# Patient Record
Sex: MALE | Race: WHITE | NOT HISPANIC OR LATINO | Employment: UNEMPLOYED | URBAN - METROPOLITAN AREA
[De-identification: names, ages, dates, MRNs, and addresses within clinical notes are randomized per-mention and may not be internally consistent; named-entity substitution may affect disease eponyms.]

---

## 2019-05-24 ENCOUNTER — OFFICE VISIT (OUTPATIENT)
Dept: RETAIL CLINIC | Facility: CLINIC | Age: 29
End: 2019-05-24

## 2019-05-24 VITALS
WEIGHT: 174 LBS | RESPIRATION RATE: 18 BRPM | TEMPERATURE: 98.4 F | HEART RATE: 106 BPM | BODY MASS INDEX: 26.37 KG/M2 | DIASTOLIC BLOOD PRESSURE: 78 MMHG | OXYGEN SATURATION: 93 % | HEIGHT: 68 IN | SYSTOLIC BLOOD PRESSURE: 112 MMHG

## 2019-05-24 DIAGNOSIS — R09.89 ABNORMAL LUNG SOUNDS: Primary | ICD-10-CM

## 2019-05-24 DIAGNOSIS — Z76.89 REFERRAL OF PATIENT: ICD-10-CM

## 2019-05-24 PROCEDURE — 99203 OFFICE O/P NEW LOW 30 MIN: CPT | Performed by: NURSE PRACTITIONER

## 2019-05-24 PROCEDURE — 94640 AIRWAY INHALATION TREATMENT: CPT | Performed by: NURSE PRACTITIONER

## 2019-05-24 RX ORDER — ALBUTEROL SULFATE 90 UG/1
2 AEROSOL, METERED RESPIRATORY (INHALATION) EVERY 4 HOURS PRN
Qty: 1 INHALER | Refills: 0 | Status: SHIPPED | OUTPATIENT
Start: 2019-05-24 | End: 2021-07-02 | Stop reason: HOSPADM

## 2019-05-24 RX ORDER — ALBUTEROL SULFATE 2.5 MG/3ML
2.5 SOLUTION RESPIRATORY (INHALATION) ONCE
Status: COMPLETED | OUTPATIENT
Start: 2019-05-24 | End: 2019-05-24

## 2019-05-24 RX ADMIN — Medication 0.5 MG: at 18:00

## 2019-05-24 RX ADMIN — ALBUTEROL SULFATE 2.5 MG: 2.5 SOLUTION RESPIRATORY (INHALATION) at 18:00

## 2019-05-24 NOTE — PROGRESS NOTES
Subjective   Cough    Marcelino Aguayo is a 29 y.o. male  who presents with cough and wheezing. Patient is HIV+ with a history of IV drug use in the past 12 mo.      Cough   This is a new problem. The current episode started in the past 7 days. The problem has been gradually worsening. The problem occurs every few minutes. The cough is non-productive. Associated symptoms include shortness of breath (with activity) and wheezing. Pertinent negatives include no chest pain, chills, ear pain, fever, headaches, heartburn, hemoptysis, myalgias, nasal congestion, postnasal drip, rash or sweats. Exacerbated by: activity. Risk factors for lung disease include smoking/tobacco exposure. Treatments tried: Benadryl, zyrtec. The treatment provided no relief. His past medical history is significant for bronchitis, environmental allergies and pneumonia. There is no history of asthma.        History Obtained from: Patient    Past Medical History:   Diagnosis Date   • Allergic     seasonal   • Bronchitis    • HIV positive (CMS/[facility])    • IV drug user    • Pneumonia      History reviewed. No pertinent surgical history.  Social History     Tobacco Use   • Smoking status: Current Every Day Smoker     Packs/day: 1.00     Years: 10.00     Pack years: 10.00     Types: Cigarettes   • Smokeless tobacco: Never Used   Substance Use Topics   • Alcohol use: No     Frequency: Never   • Drug use: Yes     Types: Methamphetamines     Comment: history of use; currently in residential treatment facility     History reviewed. No pertinent family history.  Allergies   Allergen Reactions   • Sulfa Antibiotics Hives     Current Outpatient Medications   Medication Sig Dispense Refill   • Dlmmscj-Gtuab-Mhefmerw-TenofAF (GENVOYA PO) Take  by mouth.     • PRAVASTATIN SODIUM PO Take  by mouth.     • albuterol sulfate  (90 Base) MCG/ACT inhaler Inhale 2 puffs Every 4 (Four) Hours As Needed for Wheezing. 1 inhaler 0     Current Facility-Administered  "Medications   Medication Dose Route Frequency Provider Last Rate Last Dose   • albuterol (PROVENTIL) nebulizer solution 0.083% 2.5 mg/3mL  2.5 mg Nebulization Once MoralesMaribell APRN       • ipratropium (ATROVENT) nebulizer solution 0.5 mg  0.5 mg Nebulization Once Morales, HUAN Rolon            The following portions of the patient's history were reviewed and updated as appropriate: allergies, current medications, past family history, past medical history, past social history and past surgical history.    Review of Systems   Constitutional: Negative for chills, fatigue, fever and unexpected weight change.   HENT: Negative for congestion (no nasal congestion), ear pain and postnasal drip.    Eyes: Negative.    Respiratory: Positive for cough, shortness of breath (with activity) and wheezing. Negative for hemoptysis and stridor.    Cardiovascular: Negative for chest pain and palpitations.   Gastrointestinal: Negative.  Negative for heartburn.   Endocrine: Negative.    Musculoskeletal: Negative for joint swelling, myalgias and neck stiffness.   Skin: Negative for rash and wound.   Allergic/Immunologic: Positive for environmental allergies and immunocompromised state.   Neurological: Negative for dizziness and headaches.   Psychiatric/Behavioral: Negative.        Objective     VITAL SIGNS:   Vitals:    05/24/19 1759   BP: 112/78   Pulse: 71   Resp: 18   Temp: 98.4 °F (36.9 °C)   SpO2: 95%   Weight: 78.9 kg (174 lb)   Height: 172.7 cm (68\")   Body mass index is 26.46 kg/m².    Physical Exam   Constitutional:  Non-toxic appearance. No distress.   HENT:   Head: Atraumatic.   Right Ear: External ear and ear canal normal. Tympanic membrane is not perforated and not bulging.   Left Ear: External ear and ear canal normal. Tympanic membrane is not perforated and not bulging.   Nose: No mucosal edema or nasal deformity. Right sinus exhibits no maxillary sinus tenderness and no frontal sinus tenderness. Left sinus exhibits " no maxillary sinus tenderness and no frontal sinus tenderness.   Mouth/Throat: Uvula is midline. No uvula swelling. No posterior oropharyngeal edema or posterior oropharyngeal erythema.   Eyes: EOM and lids are normal. No scleral icterus. Right pupil is round. Left pupil is round. Pupils are equal.   Neck: Normal range of motion and phonation normal. Neck supple. No JVD present. No tracheal deviation present.   Cardiovascular: Normal rate and regular rhythm.   No murmur heard.  Pulmonary/Chest: No accessory muscle usage or stridor. No tachypnea. No respiratory distress. He has no decreased breath sounds. He has wheezes (generalized course expiratory wheezes bilaterally). He has rhonchi.   There is no change in lung sounds following neb tx   Lymphadenopathy:     He has no cervical adenopathy.        Right: No supraclavicular adenopathy present.        Left: No supraclavicular adenopathy present.   Neurological: He is alert. Gait normal.   Skin: Skin is warm and dry. Capillary refill takes less than 2 seconds. No cyanosis.   Psychiatric: His behavior is normal. He is attentive.       LABS: No results found for this or any previous visit.      Assessment/Plan     Marcelino was seen today for cough.    Diagnoses and all orders for this visit:    Abnormal lung sounds  -     albuterol (PROVENTIL) nebulizer solution 0.083% 2.5 mg/3mL  -     ipratropium (ATROVENT) nebulizer solution 0.5 mg    Referral of patient    Other orders  -     albuterol sulfate  (90 Base) MCG/ACT inhaler; Inhale 2 puffs Every 4 (Four) Hours As Needed for Wheezing.        PLAN: Due to persistent abnormal lung sounds and high risk factors, I am referring the patient to ER for further evaluation. He is in no acute distress and is accompanied by an adult.     The patient voiced understanding and agreement to the patient treatment plan and instructions       HUAN Yoder

## 2021-10-15 PROCEDURE — 87491 CHLMYD TRACH DNA AMP PROBE: CPT | Performed by: FAMILY MEDICINE

## 2021-10-15 PROCEDURE — 87661 TRICHOMONAS VAGINALIS AMPLIF: CPT | Performed by: FAMILY MEDICINE

## 2021-10-15 PROCEDURE — 87591 N.GONORRHOEAE DNA AMP PROB: CPT | Performed by: FAMILY MEDICINE

## 2021-10-19 ENCOUNTER — TELEPHONE (OUTPATIENT)
Dept: URGENT CARE | Facility: CLINIC | Age: 31
End: 2021-10-19

## 2021-10-19 NOTE — TELEPHONE ENCOUNTER
----- Message from HUAN Burgess sent at 10/19/2021  8:08 AM EDT -----  NuSwab negative for Chlamydia, Gonorrhea, and Trichomonas.   tcb

## 2022-03-18 ENCOUNTER — TRANSCRIBE ORDERS (OUTPATIENT)
Dept: PHYSICAL THERAPY | Facility: HOSPITAL | Age: 32
End: 2022-03-18

## 2022-03-18 DIAGNOSIS — G89.29 CHRONIC RIGHT-SIDED LOW BACK PAIN WITHOUT SCIATICA: Primary | ICD-10-CM

## 2022-03-18 DIAGNOSIS — M54.50 CHRONIC RIGHT-SIDED LOW BACK PAIN WITHOUT SCIATICA: Primary | ICD-10-CM

## 2022-03-22 ENCOUNTER — HOSPITAL ENCOUNTER (OUTPATIENT)
Dept: PHYSICAL THERAPY | Facility: HOSPITAL | Age: 32
Setting detail: THERAPIES SERIES
Discharge: HOME | End: 2022-03-22

## 2022-03-22 DIAGNOSIS — M54.50 CHRONIC RIGHT-SIDED LOW BACK PAIN WITHOUT SCIATICA: Primary | ICD-10-CM

## 2022-03-22 DIAGNOSIS — G89.29 CHRONIC RIGHT-SIDED LOW BACK PAIN WITHOUT SCIATICA: Primary | ICD-10-CM

## 2022-03-22 PROCEDURE — 97161 PT EVAL LOW COMPLEX 20 MIN: CPT

## 2022-03-22 NOTE — THERAPY EVALUATION
Outpatient Physical Therapy Ortho Initial Evaluation   [facility]     Patient Name: Marcelino Aguayo  : 1990  MRN: [id number]  Today's Date: 3/22/2022      Visit Date: 2022    There is no problem list on file for this patient.       Past Medical History:   Diagnosis Date   • Allergic     seasonal   • Bronchitis    • HIV positive (HCC)    • IV drug user    • Pneumonia         No past surgical history on file.    Visit Dx:     ICD-10-CM ICD-9-CM   1. Chronic right-sided low back pain without sciatica  M54.50 724.2    G89.29 338.29          Patient History     Row Name 22 1430             History    Chief Complaint Pain  -      Type of Pain Back pain  -      Brief Description of Current Complaint Couple months ago, he noticed lower back was hurting. More on the right side of his lower back and sometimes would go to the left and right back and forth. PCP provided xrays may mention DDD. Denies pins and needes, numbness and tingling, no change in bowel or blader or saddle anesthesia.  -      Patient/Caregiver Goals Relieve pain;Return to prior level of function;Know what to do to help the symptoms  -      Occupation/sports/leisure  at [employer]  -              Pain     Pain Location Back  -      Pain at Present 7  -      Pain at Best 7  -      Pain at Worst 7  -      Pain Frequency Constant/continuous  -      Pain Description Discomfort  -      What Performance Factors Make the Current Problem(s) WORSE? standing too long, bending forwards, leaning to the right  -      What Performance Factors Make the Current Problem(s) BETTER? laying down in bed on back  -      Tolerance Time- Standing less than 1 hour  -      Tolerance Time- Sitting less than 1 hour  -      Tolerance Time- Walking does not prevent form any distance  -      Is your sleep disturbed? No  -      Difficulties at work? yes  -      Difficulties with ADL's? yes  -      Difficulties  with recreational activities? yes  -              Fall Risk Assessment    Any falls in the past year: No  did spring right ankle 4 months ago and had to wear a boot for 2 weeks  -              Daily Activities    Primary Language English  -      Pt Participated in POC and Goals Yes  -            User Key  (r) = Recorded By, (t) = Taken By, (c) = Cosigned By    Initials Name Provider Type    Mickey Wang, SANDRA Physical Therapist                 PT Ortho     Row Name 03/22/22 9635       Subjective Pain    Able to rate subjective pain? yes  -    Pre-Treatment Pain Level 7  -    Post-Treatment Pain Level 7  -       Posture/Observations    Alignment Options Rounded shoulders  -    Rounded Shoulders Mild  -    Posture/Observations Comments Right lateral lean sitting, TTP at lumbar spinous processes L2-5. TTP at right QL and lumbar parspinals.  -       Special Tests/Palpation    Special Tests/Palpation Lumbar/SI;Hip  Negative SLR, postive FADIR on RLE, postive Kory on RLE, right mimi test positive mild tightness hip flexor, increased pull on right QL with lumbar rotation supine and hooklying  -       General ROM    GENERAL ROM COMMENTS Lumbar flexion increased pain and moderate impairment, left rotation no change in pain, right rotation increased pain, ext increased pain, right lat flex no change, left lat flexion pulling on right QL increased pain. Increased tightness in right QL.  -       MMT (Manual Muscle Testing)    General MMT Comments BLE 5/5 no focal weakness besides right hip flexor and abd 4+/5  -       Sensation    Sensation WNL? WNL  -          User Key  (r) = Recorded By, (t) = Taken By, (c) = Cosigned By    Initials Name Provider Type    Mickey Wang, SANDRA Physical Therapist                            Therapy Education  Education Details: HEP included: kneeling hip flexor and QL stretch, standing QL stretch, piriformis stretch, press ups  Given: HEP, Symptoms/condition  management, Pain management, Posture/body mechanics  Program: New  How Provided: Verbal, Demonstration, Written  Provided to: Patient  Level of Understanding: Teach back education performed, Verbalized, Demonstrated      PT OP Goals     Row Name 03/22/22 1430          PT Short Term Goals    STG Date to Achieve 04/12/22  -     STG 1 Pt will report improvement in symptoms 50% or more  -     STG 1 Progress New  -     STG 2 Pt will report adherence to HEP for optimal outcomes  -     STG 2 Progress [facility]            Long Term Goals    LTG Date to Achieve 05/03/22  -     LTG 1 Pt will be able to stand for more than 1 hour without limitations from pain to complete work duties  -     LTG 1 Progress New  [facility]     LTG 2 Pt will be able to perform all job duties without pain  -     LTG 2 Progress [facility]     LTG 3 Pt will be able to demonstrate full lumbar flexion without pain to complete daily activities  -     LTG 3 Progress [facility]            Time Calculation    PT Goal Re-Cert Due Date 06/20/22  -           User Key  (r) = Recorded By, (t) = Taken By, (c) = Cosigned By    Initials Name Provider Type     Mickey Wilkins, PT Physical Therapist                 PT Assessment/Plan     Row Name 03/22/22 1430          PT Assessment    Functional Limitations Performance in work activities;Performance in leisure activities;Limitations in community activities  -     Impairments Gait;Pain;Posture;Range of motion  -     Assessment Comments Pt is a 32 year old male that presents with evolving symptoms of low complexity. Pt presents with right sided low back pain without sciatica. Pt's pain started several weeks after wearing a walking boot on his right LE and appears to have led to his low back pain from possible gait compensations. Skilled care warranted to address impairments and limitations to meet his goals.  -     Please refer to paper survey for additional self-reported information Yes  -     Rehab  Potential Good  -     Patient/caregiver participated in establishment of treatment plan and goals Yes  -     Patient would benefit from skilled therapy intervention Yes  -            PT Plan    PT Frequency 1x/week;2x/week  -     Predicted Duration of Therapy Intervention (PT) 4-6 visits  -     Planned CPT's? PT EVAL LOW COMPLEXITY: 32509;PT THER PROC EA 15 MIN: 11690;PT THER ACT EA 15 MIN: 23927;PT MANUAL THERAPY EA 15 MIN: 04776;PT NEUROMUSC RE-EDUCATION EA 15 MIN: 82385;PT GAIT TRAINING EA 15 MIN: 03166  -     PT Plan Comments Plan to address patient’s impairments and limitations with skilled PT interventions focusing on improving overall decrease in pain for improved ability to perform daily activities.  -           User Key  (r) = Recorded By, (t) = Taken By, (c) = Cosigned By    Initials Name Provider Type    Mickey Wang, PT Physical Therapist                   OP Exercises     Row Name 03/22/22 1430             Subjective Pain    Able to rate subjective pain? yes  -      Pre-Treatment Pain Level 7  -      Post-Treatment Pain Level 7  -            User Key  (r) = Recorded By, (t) = Taken By, (c) = Cosigned By    Initials Name Provider Type    Mickey Wang, PT Physical Therapist                              Outcome Measure Options: Modifed Owestry  Modified Oswestry  Modified Oswestry Score/Comments: 14% (missing pain intensity box)      Time Calculation:     Start Time: 1430  Untimed Charges  PT Eval/Re-eval Minutes: 60  Total Minutes  Untimed Charges Total Minutes: 60   Total Minutes: 60     Therapy Charges for Today     Code Description Service Date Service Provider Modifiers Qty    [id number]  PT EVAL LOW COMPLEXITY 4 3/22/2022 Mickey Wilkins, PT GP 1          PT G-Codes  Outcome Measure Options: Modifed Owestry  Modified Oswestry Score/Comments: 14% (missing pain intensity box)         Mickey Wilkins PT  3/22/2022

## 2022-04-13 ENCOUNTER — DOCUMENTATION (OUTPATIENT)
Dept: PHYSICAL THERAPY | Facility: HOSPITAL | Age: 32
End: 2022-04-13

## 2022-04-13 DIAGNOSIS — G89.29 CHRONIC RIGHT-SIDED LOW BACK PAIN WITHOUT SCIATICA: Primary | ICD-10-CM

## 2022-04-13 DIAGNOSIS — M54.50 CHRONIC RIGHT-SIDED LOW BACK PAIN WITHOUT SCIATICA: Primary | ICD-10-CM

## 2022-04-13 NOTE — THERAPY DISCHARGE NOTE
Outpatient Physical Therapy Discharge Summary         Patient Name: Marcelino Aguayo  : 1990  MRN: [id number]    Today's Date: 2022    Visit Dx:    ICD-10-CM ICD-9-CM   1. Chronic right-sided low back pain without sciatica  M54.50 724.2    G89.29 338.29           OP PT Discharge Summary  Date of Discharge: 22  Reason for Discharge: Unable to participate  Outcomes Achieved: Unable to make functional progress toward goals at this time  Discharge Destination: Home with home program  Discharge Instructions/Additional Comments: Patient seen for initital eval and did not show up for follow up appointment. Will discharge for this reason.      Time Calculation:                    Mickey Wilkins, PT  2022

## 2022-08-15 ENCOUNTER — APPOINTMENT (OUTPATIENT)
Dept: GENERAL RADIOLOGY | Facility: HOSPITAL | Age: 32
End: 2022-08-15

## 2022-08-15 ENCOUNTER — HOSPITAL ENCOUNTER (EMERGENCY)
Facility: HOSPITAL | Age: 32
Discharge: HOME | End: 2022-08-15
Attending: EMERGENCY MEDICINE | Admitting: EMERGENCY MEDICINE

## 2022-08-15 VITALS
HEART RATE: 97 BPM | BODY MASS INDEX: 25.76 KG/M2 | RESPIRATION RATE: 12 BRPM | WEIGHT: 170 LBS | DIASTOLIC BLOOD PRESSURE: 69 MMHG | TEMPERATURE: 100.7 F | OXYGEN SATURATION: 95 % | HEIGHT: 68 IN | SYSTOLIC BLOOD PRESSURE: 109 MMHG

## 2022-08-15 DIAGNOSIS — J06.9 UPPER RESPIRATORY TRACT INFECTION, UNSPECIFIED TYPE: ICD-10-CM

## 2022-08-15 DIAGNOSIS — R50.9 FEVER, UNSPECIFIED FEVER CAUSE: Primary | ICD-10-CM

## 2022-08-15 LAB
FLUAV RNA RESP QL NAA+PROBE: NOT DETECTED
FLUBV RNA RESP QL NAA+PROBE: NOT DETECTED
SARS-COV-2 RNA RESP QL NAA+PROBE: NOT DETECTED

## 2022-08-15 PROCEDURE — 71045 X-RAY EXAM CHEST 1 VIEW: CPT

## 2022-08-15 PROCEDURE — 87636 SARSCOV2 & INF A&B AMP PRB: CPT

## 2022-08-15 PROCEDURE — 99283 EMERGENCY DEPT VISIT LOW MDM: CPT

## 2022-08-15 RX ORDER — IBUPROFEN 400 MG/1
800 TABLET ORAL ONCE
Status: COMPLETED | OUTPATIENT
Start: 2022-08-15 | End: 2022-08-15

## 2022-08-15 RX ADMIN — IBUPROFEN 800 MG: 400 TABLET, FILM COATED ORAL at 15:45

## 2022-08-15 NOTE — ED PROVIDER NOTES
Subjective   Pt is a 31 yo male presenting to ED with complaints of fever. PMHx significant for HIV. Pt reports developing chills and nasal congestion this morning. He came to ED and on arrival temp 100.7 and hasn't taken any meds PTA. He denies sore throat, headache, cough, CP, SOB, N/V/D, abdominal pain, rash, neck pain, back pain or urinary sx. He denies known sick contacts. He has had Covid vaccines plus x1 booster as well as Flu vaccine. She uses tobacco but denies ETOH use. He reports diagnosis of HIV 2013 and is followed by ID at . He reports levels are undetectable and compliance with meds. He is in a MSM and denies any known contact with Monkey Pox.      History provided by:  Patient and medical records      Review of Systems   Constitutional: Positive for chills, fatigue and fever.   HENT: Positive for congestion. Negative for ear pain and sore throat.    Eyes: Negative for visual disturbance.   Respiratory: Negative for cough and shortness of breath.    Cardiovascular: Negative for chest pain and leg swelling.   Gastrointestinal: Negative for abdominal pain, diarrhea, nausea and vomiting.   Genitourinary: Negative for difficulty urinating, flank pain, frequency and hematuria.   Musculoskeletal: Negative for arthralgias, back pain and neck pain.   Skin: Negative for rash.   Neurological: Negative for dizziness, syncope, weakness and headaches.   Psychiatric/Behavioral: Negative for confusion.       Past Medical History:   Diagnosis Date   • Allergic     seasonal   • Bronchitis    • HIV positive ([facility])    • IV drug user    • Pneumonia        Allergies   Allergen Reactions   • Sulfa Antibiotics Hives       No past surgical history on file.    No family history on file.    Social History     Socioeconomic History   • Marital status: Single   Tobacco Use   • Smoking status: Current Every Day Smoker     Packs/day: 1.00     Years: 10.00     Pack years: 10.00     Types: Cigarettes   • Smokeless tobacco: Never  Used   Vaping Use   • Vaping Use: Never used   Substance and Sexual Activity   • Alcohol use: No   • Drug use: Yes     Types: Methamphetamines     Comment: history of use; currently in residential treatment facility   • Sexual activity: Defer           Objective   Physical Exam  Vitals and nursing note reviewed.   Constitutional:       Appearance: He is well-developed.   HENT:      Head: Atraumatic.      Nose: Nose normal.   Eyes:      General: Lids are normal.      Conjunctiva/sclera: Conjunctivae normal.      Pupils: Pupils are equal, round, and reactive to light.   Cardiovascular:      Rate and Rhythm: Regular rhythm. Tachycardia present.      Heart sounds: Normal heart sounds.   Pulmonary:      Effort: Pulmonary effort is normal.      Breath sounds: Normal breath sounds.   Abdominal:      General: There is no distension.      Palpations: Abdomen is soft.      Tenderness: There is no abdominal tenderness. There is no guarding or rebound.   Musculoskeletal:         General: No tenderness or deformity. Normal range of motion.      Cervical back: Normal range of motion and neck supple.   Skin:     General: Skin is warm and dry.   Neurological:      Mental Status: He is alert and oriented to person, place, and time.      Sensory: No sensory deficit.   Psychiatric:         Behavior: Behavior normal.         Procedures           ED Course      Discussed results and tx plan. Discussed low threshold and any new symptoms for reevaluation with ID / PCP or ED. Discussed symptomatic tx. He is agreeable with plan.     Discussed patient with Dr. Suh who is agreeable with ED course and tx plan.    Reviewed old records.     Recent Results (from the past 24 hour(s))   COVID-19 and FLU A/B PCR - Swab, Nasopharynx    Collection Time: 08/15/22  1:14 PM    Specimen: Nasopharynx; Swab   Result Value Ref Range    COVID19 Not Detected Not Detected - Ref. Range    Influenza A PCR Not Detected Not Detected    Influenza B PCR Not  "Detected Not Detected     Note: In addition to lab results from this visit, the labs listed above may include labs taken at another facility or during a different encounter within the last 24 hours. Please correlate lab times with ED admission and discharge times for further clarification of the services performed during this visit.    XR Chest 1 View   Final Result   No active disease.       This report was finalized on 8/15/2022 2:37 PM by Berry Fountain MD.            Vitals:    08/15/22 1311 08/15/22 1547   BP: 135/80 109/69   BP Location: Left arm    Patient Position: Sitting    Pulse: (!) 133 97   Resp: 20 12   Temp: (!) 100.7 °F (38.2 °C)    TempSrc: Oral    SpO2: 93% 95%   Weight: 77.1 kg (170 lb)    Height: 172.7 cm (68\")      Medications   ibuprofen (ADVIL,MOTRIN) tablet 800 mg (800 mg Oral Given 8/15/22 1545)     ECG/EMG Results (last 24 hours)     ** No results found for the last 24 hours. **        No orders to display       DISCHARGE    Patient discharged in stable condition.    Reviewed implications of results, diagnosis, meds, responsibility to follow up, warning signs and symptoms of possible worsening, potential complications and reasons to return to ER.    Patient/Family voiced understanding of above instructions.    Discussed plan for discharge, as there is no emergent indication for admission.  Pt/family is agreeable and understands need for follow up and possible repeat testing.  Pt/family is aware that discharge does not mean that nothing is wrong but that it indicates no emergency is currently present that requires admission and they must continue care with follow-up as given below or with a physician of their choice.     FOLLOW-UP  Jeramy Fox MD   DIVISION OF INFECTIOUS DISEASES  [address] [facility] [address] [phone]    Schedule an appointment as soon as possible for a visit       [facility] Department  [address] [place] [address] " [facility] [address] [phone]    If symptoms worsen         Medication List      No changes were made to your prescriptions during this visit.                                            MDM    Final diagnoses:   Fever, unspecified fever cause   Upper respiratory tract infection, unspecified type       ED Disposition  ED Disposition     ED Disposition   Discharge    Condition   Stable    Comment   --             Jeramy Fox MD   DIVISION OF INFECTIOUS DISEASES  [address] [place] [id number] [phone]    Schedule an appointment as soon as possible for a visit       [facility] Emergency Department  [address] [facility] [address] [phone]    If symptoms worsen         Medication List      No changes were made to your prescriptions during this visit.          Sara Dumas PA  08/15/22 1554